# Patient Record
Sex: FEMALE | Race: WHITE | NOT HISPANIC OR LATINO
[De-identification: names, ages, dates, MRNs, and addresses within clinical notes are randomized per-mention and may not be internally consistent; named-entity substitution may affect disease eponyms.]

---

## 2025-06-24 ENCOUNTER — DASHBOARD ENCOUNTERS (OUTPATIENT)
Age: 65
End: 2025-06-24

## 2025-06-24 ENCOUNTER — LAB OUTSIDE AN ENCOUNTER (OUTPATIENT)
Dept: URBAN - METROPOLITAN AREA CLINIC 63 | Facility: CLINIC | Age: 65
End: 2025-06-24

## 2025-06-24 ENCOUNTER — OFFICE VISIT (OUTPATIENT)
Dept: URBAN - METROPOLITAN AREA CLINIC 63 | Facility: CLINIC | Age: 65
End: 2025-06-24
Payer: COMMERCIAL

## 2025-06-24 DIAGNOSIS — K21.9 CHRONIC GERD: ICD-10-CM

## 2025-06-24 DIAGNOSIS — K58.0 IRRITABLE BOWEL SYNDROME WITH DIARRHEA: ICD-10-CM

## 2025-06-24 DIAGNOSIS — K59.09 CHRONIC CONSTIPATION: ICD-10-CM

## 2025-06-24 PROBLEM — 235595009: Status: ACTIVE | Noted: 2025-06-24

## 2025-06-24 PROBLEM — 197125005: Status: ACTIVE | Noted: 2025-06-24

## 2025-06-24 PROCEDURE — 99204 OFFICE O/P NEW MOD 45 MIN: CPT

## 2025-06-24 RX ORDER — VITAMIN B COMPLEX
AS DIRECTED CAPSULE ORAL
Status: ACTIVE | COMMUNITY

## 2025-06-24 RX ORDER — PHENTERMINE HYDROCHLORIDE 37.5 MG/1
TAKE ONE TABLET BY MOUTH ONE TIME DAILY AS DIRECTED TABLET ORAL
Qty: 30 UNSPECIFIED | Refills: 0 | Status: ACTIVE | COMMUNITY

## 2025-06-24 RX ORDER — LOSARTAN POTASSIUM 25 MG/1
TAKE ONE TABLET BY MOUTH ONE TIME DAILY AS DIRECTED (DOSE DECREASED) TABLET, FILM COATED ORAL
Qty: 30 UNSPECIFIED | Refills: 0 | Status: ACTIVE | COMMUNITY

## 2025-06-24 RX ORDER — MAGNESIUM 200 MG
2 TABLETS WITH A MEAL TABLET ORAL ONCE A DAY
Status: ACTIVE | COMMUNITY

## 2025-06-24 RX ORDER — ALPRAZOLAM 0.5 MG/1
TABLET ORAL
Qty: 30 TABLET | Status: ACTIVE | COMMUNITY

## 2025-06-24 RX ORDER — CHOLECALCIFEROL (VITAMIN D3) 1250 MCG
TAKE ONE CAPSULE BY MOUTH EVERY WEEK CAPSULE ORAL
Qty: 13 UNSPECIFIED | Refills: 0 | Status: ACTIVE | COMMUNITY

## 2025-06-24 RX ORDER — OMEPRAZOLE 40 MG/1
TAKE ONE CAPSULE BY MOUTH EVERY MORNING CAPSULE, DELAYED RELEASE ORAL
Qty: 30 UNSPECIFIED | Refills: 1 | Status: ACTIVE | COMMUNITY

## 2025-06-24 NOTE — HPI-TODAY'S VISIT:
6/25 This is a 64 years old female patient with past medical history of IBS with diarrhea, chronic constipation, chronic GERD seen in the office visit today in good general state, patient reported she started presenting as stomach discomfort, feeling bloating with gaseous, indigestion, acid reflux and heartburn symptoms, patient is taking omeprazole 40 mg in the morning time, reported last EGD was 14 years ago, she has previous diagnosis of gastric ulcers, patient reported diarrhea alternating with constipation most diarrhea, she usually has a flare every time that she is under stress, reported she has started taking Xanax 0.5 as needed and symptoms has improved, Patient last colonoscopy was over 8 years ago reported she was cleared for 10 years, even though she presented with symptoms she would like to avoid colonoscopy, patient was educated on the importance of the colonoscopy, patient states she will think about it and will give her the answer during the next appointment. Patient denies having any chest pain, SOB. Denies history of CAD, CHF, COPD, Stroke. Patient denies undergoing any cardiac evaluation currently. Will plan to do EGD and continue with current treatment.

## 2025-06-27 ENCOUNTER — CLAIMS CREATED FROM THE CLAIM WINDOW (OUTPATIENT)
Dept: URBAN - METROPOLITAN AREA SURGERY CENTER 4 | Facility: SURGERY CENTER | Age: 65
End: 2025-06-27
Payer: COMMERCIAL

## 2025-06-27 DIAGNOSIS — K44.9 DIAPHRAGMATIC HERNIA WITHOUT OBSTRUCTION OR GANGRENE: ICD-10-CM

## 2025-06-27 DIAGNOSIS — K31.7 POLYP OF STOMACH AND DUODENUM: ICD-10-CM

## 2025-06-27 DIAGNOSIS — K29.60 OTHER GASTRITIS WITHOUT BLEEDING: ICD-10-CM

## 2025-06-27 PROCEDURE — 43239 EGD BIOPSY SINGLE/MULTIPLE: CPT | Performed by: INTERNAL MEDICINE

## 2025-06-27 RX ORDER — PHENTERMINE HYDROCHLORIDE 37.5 MG/1
TAKE ONE TABLET BY MOUTH ONE TIME DAILY AS DIRECTED TABLET ORAL
Qty: 30 UNSPECIFIED | Refills: 0 | Status: ACTIVE | COMMUNITY

## 2025-06-27 RX ORDER — LOSARTAN POTASSIUM 25 MG/1
TAKE ONE TABLET BY MOUTH ONE TIME DAILY AS DIRECTED (DOSE DECREASED) TABLET, FILM COATED ORAL
Qty: 30 UNSPECIFIED | Refills: 0 | Status: ACTIVE | COMMUNITY

## 2025-06-27 RX ORDER — VITAMIN B COMPLEX
AS DIRECTED CAPSULE ORAL
Status: ACTIVE | COMMUNITY

## 2025-06-27 RX ORDER — MAGNESIUM 200 MG
2 TABLETS WITH A MEAL TABLET ORAL ONCE A DAY
Status: ACTIVE | COMMUNITY

## 2025-06-27 RX ORDER — OMEPRAZOLE 40 MG/1
TAKE ONE CAPSULE BY MOUTH EVERY MORNING CAPSULE, DELAYED RELEASE ORAL
Qty: 30 UNSPECIFIED | Refills: 1 | Status: ACTIVE | COMMUNITY

## 2025-06-27 RX ORDER — CHOLECALCIFEROL (VITAMIN D3) 1250 MCG
TAKE ONE CAPSULE BY MOUTH EVERY WEEK CAPSULE ORAL
Qty: 13 UNSPECIFIED | Refills: 0 | Status: ACTIVE | COMMUNITY

## 2025-06-27 RX ORDER — ALPRAZOLAM 0.5 MG/1
TABLET ORAL
Qty: 30 TABLET | Status: ACTIVE | COMMUNITY

## 2025-07-18 ENCOUNTER — OFFICE VISIT (OUTPATIENT)
Dept: URBAN - METROPOLITAN AREA CLINIC 63 | Facility: CLINIC | Age: 65
End: 2025-07-18
Payer: COMMERCIAL

## 2025-07-18 DIAGNOSIS — K58.0 IRRITABLE BOWEL SYNDROME WITH DIARRHEA: ICD-10-CM

## 2025-07-18 DIAGNOSIS — K21.9 CHRONIC GERD: ICD-10-CM

## 2025-07-18 PROCEDURE — 99214 OFFICE O/P EST MOD 30 MIN: CPT

## 2025-07-18 RX ORDER — CHOLECALCIFEROL (VITAMIN D3) 1250 MCG
TAKE ONE CAPSULE BY MOUTH EVERY WEEK CAPSULE ORAL
Qty: 13 UNSPECIFIED | Refills: 0 | Status: ACTIVE | COMMUNITY

## 2025-07-18 RX ORDER — OMEPRAZOLE 40 MG/1
TAKE ONE CAPSULE BY MOUTH EVERY MORNING CAPSULE, DELAYED RELEASE ORAL
Qty: 30 UNSPECIFIED | Refills: 1 | Status: ACTIVE | COMMUNITY

## 2025-07-18 RX ORDER — OMEPRAZOLE 40 MG/1
TAKE ONE CAPSULE BY MOUTH EVERY MORNING CAPSULE, DELAYED RELEASE ORAL TWICE DAILY
Qty: 180 | Refills: 1

## 2025-07-18 RX ORDER — GLUCOSAMINE SULFATE DIPOT CHLR 1000 MG
AS DIRECTED TABLET ORAL
Status: ACTIVE | COMMUNITY

## 2025-07-18 RX ORDER — ALPRAZOLAM 0.5 MG/1
TABLET ORAL
Qty: 30 TABLET | Status: ACTIVE | COMMUNITY

## 2025-07-18 RX ORDER — MAGNESIUM 200 MG
2 TABLETS WITH A MEAL TABLET ORAL ONCE A DAY
Status: ACTIVE | COMMUNITY

## 2025-07-18 NOTE — HPI-TODAY'S VISIT:
6/25 This is a 64 years old female patient with past medical history of IBS with diarrhea, chronic constipation, chronic GERD seen in the office visit today in good general state, patient reported she started presenting as stomach discomfort, feeling bloating with gaseous, indigestion, acid reflux and heartburn symptoms, patient is taking omeprazole 40 mg in the morning time, reported last EGD was 14 years ago, she has previous diagnosis of gastric ulcers, patient reported diarrhea alternating with constipation most diarrhea, she usually has a flare every time that she is under stress, reported she has started taking Xanax 0.5 as needed and symptoms has improved, Patient last colonoscopy was over 8 years ago reported she was cleared for 10 years, even though she presented with symptoms she would like to avoid colonoscopy, patient was educated on the importance of the colonoscopy, patient states she will think about it and will give her the answer during the next appointment. Patient denies having any chest pain, SOB. Denies history of CAD, CHF, COPD, Stroke. Patient denies undergoing any cardiac evaluation currently. Will plan to do EGD and continue with current treatment.  7/25 I reviewed the EGD findings and prep with patient . Reviewed the path removed including path results All questions answered  to satisfaction. Patient reported acid reflux symptoms improved during the day, she is still having symptoms during the night, she is trying to follow the GERD diet, we will plan to add omeprazole 40 mg twice a day, Patient reported she noticed since she started taking Xanax 0.5 mg twice a day symptoms improved a lot, also patient denied any diarrhea or constipation at this time will follow-up patient in 6 months.   EGD 6/27/2025 Findings: The examined esophagus was normal.  Biopsies were taken with cold forceps for histology. Small hiatal hernia was present. Diffuse mild formation corrected by the time of on the gastric antrum.  Biopsies were taken with cold forceps for H. pylori testing. Multiple small sessile polyps with no bleeding and no stigmata of recent bleeding were found in the gastric fundus and the gastric body.  Biopsies were taken cold forceps for histology. The examined duodenum was normal.  Biopsies were taken for evaluation of celiac disease.  Pathology result 7/20/2025 A duodenum, second part, biopsy No significant abnormality. B stomach, body, biopsy Fundic gland polyp.  No evidence of H. pylori organisms or intestinal metaplasia negative for dysplasia or malignancy. C stomach, antrum, body, biopsy Proton pump inhibitor effect no evidence of H. pylori organism intestinal metaplasia negative for dysplasia or malignancy. D gastroesophageal junction, biopsy Mucosa with reflux-type changes.  No evidence of Eosinophilic esophagitistis negative for infectious organisms dysplasia or malignancy

## 2025-07-21 ENCOUNTER — TELEPHONE ENCOUNTER (OUTPATIENT)
Dept: URBAN - METROPOLITAN AREA CLINIC 63 | Facility: CLINIC | Age: 65
End: 2025-07-21

## 2025-07-23 ENCOUNTER — TELEPHONE ENCOUNTER (OUTPATIENT)
Dept: URBAN - METROPOLITAN AREA CLINIC 63 | Facility: CLINIC | Age: 65
End: 2025-07-23